# Patient Record
(demographics unavailable — no encounter records)

---

## 2024-11-12 NOTE — HISTORY OF PRESENT ILLNESS
[de-identified] : SAW CARIDOLOGY AND TO FOLLOW-UP TOMORROW.  JUST HAD LAB WORK.  STARTED ON FLORINEF 0.1 MG DAILY.  CANE FOR SUPPORT.  DOESN'T WANT SURGERY. WON'T TAKE CHOLESTEROL MEDICATION WILL HOLD OFF ON TESTING SEES ENDOCRINOLOGY

## 2024-11-28 NOTE — PLAN
[FreeTextEntry1] : LAB WORK REVIEWED FROM 11/12/24: B12 372, TSH 3.03, T4 1.2 B12 SHOT GIVEN AND TOLERATED WELL WILL MONITOR B12 LEVEL TFTS WELL CONTROLLED CONTINUE CURRENT DOSAGE OF LEVOTHYROXINE CALL WITH ANY QUESTIONS, CONCERNS OR CHANGES

## 2024-11-28 NOTE — HISTORY OF PRESENT ILLNESS
[FreeTextEntry1] : F/U LAB WORK [de-identified] : MS. WREN IS A PLEASANT 84 YO PRESENTING FOR FOLLOW-UP OF LAB WORK.  IS NOT CURRENTLY TAKING VITAMIN B12 SUPPLEMENTS.  DOES FEEL TIRED AT TIMES.  HISTORY OF VITAMIN B12 DEFICIENCY.  IS ON LEVOTHYROXINE FOR HYPOTHYROIDISM.  OTHERWISE FEELING WELL TODAY.

## 2024-11-28 NOTE — HISTORY OF PRESENT ILLNESS
[FreeTextEntry1] : F/U LAB WORK [de-identified] : MS. WREN IS A PLEASANT 82 YO PRESENTING FOR FOLLOW-UP OF LAB WORK.  IS NOT CURRENTLY TAKING VITAMIN B12 SUPPLEMENTS.  DOES FEEL TIRED AT TIMES.  HISTORY OF VITAMIN B12 DEFICIENCY.  IS ON LEVOTHYROXINE FOR HYPOTHYROIDISM.  OTHERWISE FEELING WELL TODAY.